# Patient Record
Sex: FEMALE | Race: OTHER
[De-identification: names, ages, dates, MRNs, and addresses within clinical notes are randomized per-mention and may not be internally consistent; named-entity substitution may affect disease eponyms.]

---

## 2018-07-31 ENCOUNTER — HOSPITAL ENCOUNTER (EMERGENCY)
Dept: HOSPITAL 54 - ER | Age: 23
Discharge: HOME | End: 2018-07-31
Payer: SELF-PAY

## 2018-07-31 VITALS — HEIGHT: 62 IN | BODY MASS INDEX: 23.92 KG/M2 | WEIGHT: 130 LBS

## 2018-07-31 VITALS — SYSTOLIC BLOOD PRESSURE: 128 MMHG | DIASTOLIC BLOOD PRESSURE: 67 MMHG

## 2018-07-31 DIAGNOSIS — F15.10: Primary | ICD-10-CM
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
## 2018-07-31 DIAGNOSIS — F10.10: ICD-10-CM

## 2018-07-31 DIAGNOSIS — F17.200: ICD-10-CM

## 2018-07-31 DIAGNOSIS — F41.9: ICD-10-CM

## 2018-07-31 LAB
ALBUMIN SERPL BCP-MCNC: 4.7 G/DL (ref 3.4–5)
ALP SERPL-CCNC: 83 U/L (ref 46–116)
ALT SERPL W P-5'-P-CCNC: 49 U/L (ref 12–78)
APAP SERPL-MCNC: < 2 UG/ML (ref 10–30)
AST SERPL W P-5'-P-CCNC: 30 U/L (ref 15–37)
BASOPHILS # BLD AUTO: 0.1 /CMM (ref 0–0.2)
BASOPHILS NFR BLD AUTO: 1.4 % (ref 0–2)
BILIRUB DIRECT SERPL-MCNC: 0.1 MG/DL (ref 0–0.2)
BILIRUB SERPL-MCNC: 0.7 MG/DL (ref 0.2–1)
BUN SERPL-MCNC: 20 MG/DL (ref 7–18)
CALCIUM SERPL-MCNC: 9.2 MG/DL (ref 8.5–10.1)
CHLORIDE SERPL-SCNC: 104 MMOL/L (ref 98–107)
CO2 SERPL-SCNC: 27 MMOL/L (ref 21–32)
CREAT SERPL-MCNC: 0.9 MG/DL (ref 0.6–1.3)
EOSINOPHIL NFR BLD AUTO: 1.9 % (ref 0–6)
ETHANOL SERPL-MCNC: < 3 MG/DL (ref 0–0)
GLUCOSE SERPL-MCNC: 104 MG/DL (ref 74–106)
HCT VFR BLD AUTO: 40 % (ref 33–45)
HGB BLD-MCNC: 13.2 G/DL (ref 11.5–14.8)
LYMPHOCYTES NFR BLD AUTO: 2.2 /CMM (ref 0.8–4.8)
LYMPHOCYTES NFR BLD AUTO: 31.8 % (ref 20–44)
MCH RBC QN AUTO: 30 PG (ref 26–33)
MCHC RBC AUTO-ENTMCNC: 33 G/DL (ref 31–36)
MCV RBC AUTO: 91 FL (ref 82–100)
MONOCYTES NFR BLD AUTO: 0.5 /CMM (ref 0.1–1.3)
MONOCYTES NFR BLD AUTO: 7.1 % (ref 2–12)
NEUTROPHILS # BLD AUTO: 4.1 /CMM (ref 1.8–8.9)
NEUTROPHILS NFR BLD AUTO: 57.8 % (ref 43–81)
PLATELET # BLD AUTO: 364 /CMM (ref 150–450)
POTASSIUM SERPL-SCNC: 3.9 MMOL/L (ref 3.5–5.1)
PROT SERPL-MCNC: 8.5 G/DL (ref 6.4–8.2)
RBC # BLD AUTO: 4.37 MIL/UL (ref 4–5.2)
RDW COEFFICIENT OF VARIATION: 11.8 (ref 11.5–15)
SALICYLATES SERPL-MCNC: < 2.8 MG/DL (ref 2.8–20)
SODIUM SERPL-SCNC: 139 MMOL/L (ref 136–145)
WBC NRBC COR # BLD AUTO: 7 K/UL (ref 4.3–11)

## 2018-07-31 PROCEDURE — 80329 ANALGESICS NON-OPIOID 1 OR 2: CPT

## 2018-07-31 PROCEDURE — 99406 BEHAV CHNG SMOKING 3-10 MIN: CPT

## 2018-07-31 PROCEDURE — A4606 OXYGEN PROBE USED W OXIMETER: HCPCS

## 2018-07-31 PROCEDURE — 80076 HEPATIC FUNCTION PANEL: CPT

## 2018-07-31 PROCEDURE — 96374 THER/PROPH/DIAG INJ IV PUSH: CPT

## 2018-07-31 PROCEDURE — G0480 DRUG TEST DEF 1-7 CLASSES: HCPCS

## 2018-07-31 PROCEDURE — 80048 BASIC METABOLIC PNL TOTAL CA: CPT

## 2018-07-31 PROCEDURE — Z7610: HCPCS

## 2018-07-31 PROCEDURE — 85025 COMPLETE CBC W/AUTO DIFF WBC: CPT

## 2018-07-31 PROCEDURE — 36415 COLL VENOUS BLD VENIPUNCTURE: CPT

## 2018-07-31 PROCEDURE — 99284 EMERGENCY DEPT VISIT MOD MDM: CPT

## 2018-07-31 NOTE — NUR
IV removed. Catheter intact and site benign. Pressure and 4x4 applied to site. 
No bleeding noted.Patient discharged to home in stable condition. Written and 
verbal after care instructions given. Patient verbalizes understanding of 
instruction. PT REC'D JUICE ON HER WAY OUT. PT AMUBLATED OUT WITH A STEADY 
GAIT. VSS. NAD NOTED.

## 2018-07-31 NOTE — NUR
[id number], PICKED UP FROM A DENTAL OFFICE ACTING [facility]. NOTED PARANOID, 
DEPRESSED. VSS. SAFETY AND COMFORT MEASURES PROVIDED. WILL MONITOR.

## 2019-01-30 NOTE — NUR
ED Nurse Note:



Patient ingested unknown amount of meth, unknown route and time. pt was brought 
in by [facility]. per gisel, felix on the scene. pt is uncoopertive. wont allow staff 
to take vital signs. pt is talking to herself. per gisel pt denies si. will 
continue to monitor.

## 2019-01-30 NOTE — EMERGENCY ROOM REPORT
History of Present Illness


General


Chief Complaint:  Overdose


Source:  Patient, EMS





Present Illness


HPI


Is a 23-year-old female with no past medical history.  She called 911 because 

she said she was not feeling well and someone is out to get her.  She smoked 

methamphetamine today.  Denies any fever chills.  Denies suicidal thoughts 

homicidal thought.  No nausea no vomiting.  No fever or chills.


Allergies:  


Coded Allergies:  


     No Known Allergies (Unverified , 1/30/19)





Patient History


Past Medical History:  see triage record, old chart reviewed


Past Surgical History:  none


Pertinent Family History:  none


Social History:  Reports: drug use - Methamphetamine


Last Menstrual Period:  unk


Pregnant Now:  No


Immunizations:  other


Reviewed Nursing Documentation:  PMH: Agreed; PSxH: Agreed





Nursing Documentation-PMH


Past Medical History Deferred:  Pt Cognitively Impaired


Past Medical History:  Deferred





Review of Systems


Eye:  Denies: eye pain, blurred vision


ENT:  Denies: ear pain, nose congestion, throat swelling


Respiratory:  Denies: cough, shortness of breath


Cardiovascular:  Denies: chest pain, palpitations


Gastrointestinal:  Denies: abdominal pain, diarrhea, nausea, vomiting


Musculoskeletal:  Denies: back pain, joint pain


Skin:  Denies: rash


Neurological:  Denies: headache, numbness


Endocrine:  Denies: increased thirst, increased urine


Hematologic/Lymphatic:  Denies: easy bruising


All Other Systems:  negative except mentioned in HPI





Physical Exam





Vital Signs








  Date Time  Temp Pulse Resp B/P (MAP) Pulse Ox O2 Delivery O2 Flow Rate FiO2


 


1/30/19 22:28  110 16 130/96 100 Room Air  





vitals with tachycardia


Sp02 EP Interpretation:  reviewed, normal


General Appearance:  well appearing, no apparent distress, alert


Head:  normocephalic, atraumatic


Eyes:  bilateral eye PERRL, bilateral eye EOMI


ENT:  hearing grossly normal, normal pharynx


Neck:  full range of motion, supple, no meningismus


Respiratory:  chest non-tender, lungs clear, normal breath sounds


Cardiovascular #1:  regular rate, rhythm, no murmur


Gastrointestinal:  normal bowel sounds, non tender, no mass, no organomegaly, 

no bruit, non-distended


Musculoskeletal:  back normal, gait/station normal, normal range of motion


Psychiatric:  mood/affect normal


Skin:  warm/dry





Medical Decision Making


Homeless Attestation


I, The treating physician, Dr Hubert Garay, has assessed and agrees that patient 

is medically stable for discharge to an outpatient disposition.


Diagnostic Impression:  


 Primary Impression:  


 Psychosis


 Qualified Codes:  F23 - Brief psychotic disorder


 Additional Impression:  


 Methamphetamine abuse


ER Course


Patient presents with drug-induced psychosis.  No suicidal thoughts or 

homicidal thought.  No auditory hallucination.  We'll observe patient until 

better.  No criteria for 5150.





This patient is a chronic risk of self injury due to poor impulse control, 

limited coping skills, and judgment intermittently impaired by intoxication.  I 

believe that the available clinical evidence to suggest that these 

characteristics derived primarily from personality disorder and are likely very 

stable over time.  Hospitalization would likely attenuate risk of self-harm 

only during MCC period, without lasting risk reduction.  Serious self-harm

, while possible, would likely be inadvertent, and because of impulsivity, and 

foreseeable.  For these reasons, I do not believe hospitalization would provide 

meaningful reduction in risk of self-harm.





Last Vital Signs








  Date Time  Temp Pulse Resp B/P (MAP) Pulse Ox O2 Delivery O2 Flow Rate FiO2


 


1/30/19 22:37  68 16 130/96 100 Room Air  








Status:  improved


Disposition:  HOME, SELF-CARE


Condition:  Stable





Additional Instructions:  


Stop using drugs.  Follow-up with rehabilitation in 7 days.  Return if worse.











Hubert Garay MD Jan 30, 2019 22:56

## 2019-01-31 NOTE — NUR
ED Nurse Note:



Patient is being discharged cleared by ERMD. discharge paper/instruction given 
to the patient, patient verbalized understanding. patient a/o x4, ambulated out 
of Ed with steady gait, with all belongings. ID band removed. pt asked sandwich 
and food was given.

## 2019-03-02 NOTE — NUR
FOOD TRAY AND BUS CARD PROVIDED.
[facility] LCSW AT BEDSIDE FOR EVAL.
PROVIDED W LUNCH TRAY, PT TOLERATING PO WELL
PT BIBRA 87 FOR OVERDOSE, TOOK UNKNOWN AMOUNT OF ABILIFY, PT ALSO TOOK METH 
THIS AM. PT IS AAOX4, NOTED DROWSINESS, V/S STABLE, KEPT RESTED AND 
COMFORTABLE, WILL CONTINUE TO MONITOR.
PT LABS DRAWNED, URINE COLLECTED AND SENT TO LAB.
Patient given written and verbal discharge instructions. Patient verbalizes 
understanding of instructions. Patient is ambulatory with steady gait. Refuses 
offer of shelter placement. Patient given list of available shelters in 
surrounding area.
- - -

## 2019-04-01 ENCOUNTER — HOSPITAL ENCOUNTER (EMERGENCY)
Dept: HOSPITAL 72 - EMR | Age: 24
Discharge: HOME | End: 2019-04-01
Payer: COMMERCIAL

## 2019-04-01 VITALS — DIASTOLIC BLOOD PRESSURE: 73 MMHG | SYSTOLIC BLOOD PRESSURE: 106 MMHG

## 2019-04-01 VITALS — DIASTOLIC BLOOD PRESSURE: 77 MMHG | SYSTOLIC BLOOD PRESSURE: 102 MMHG

## 2019-04-01 VITALS — WEIGHT: 125 LBS | HEIGHT: 62 IN | BODY MASS INDEX: 23 KG/M2

## 2019-04-01 VITALS — SYSTOLIC BLOOD PRESSURE: 110 MMHG | DIASTOLIC BLOOD PRESSURE: 75 MMHG

## 2019-04-01 VITALS — DIASTOLIC BLOOD PRESSURE: 59 MMHG | SYSTOLIC BLOOD PRESSURE: 104 MMHG

## 2019-04-01 VITALS — SYSTOLIC BLOOD PRESSURE: 111 MMHG | DIASTOLIC BLOOD PRESSURE: 63 MMHG

## 2019-04-01 VITALS — SYSTOLIC BLOOD PRESSURE: 111 MMHG | DIASTOLIC BLOOD PRESSURE: 84 MMHG

## 2019-04-01 VITALS — DIASTOLIC BLOOD PRESSURE: 62 MMHG | SYSTOLIC BLOOD PRESSURE: 120 MMHG

## 2019-04-01 VITALS — DIASTOLIC BLOOD PRESSURE: 61 MMHG | SYSTOLIC BLOOD PRESSURE: 121 MMHG

## 2019-04-01 VITALS — DIASTOLIC BLOOD PRESSURE: 67 MMHG | SYSTOLIC BLOOD PRESSURE: 108 MMHG

## 2019-04-01 VITALS — SYSTOLIC BLOOD PRESSURE: 100 MMHG | DIASTOLIC BLOOD PRESSURE: 77 MMHG

## 2019-04-01 DIAGNOSIS — F31.9: Primary | ICD-10-CM

## 2019-04-01 DIAGNOSIS — F19.10: ICD-10-CM

## 2019-04-01 LAB
ADD MANUAL DIFF: NO
ALBUMIN SERPL-MCNC: 4.2 G/DL (ref 3.4–5)
ALBUMIN/GLOB SERPL: 1.2 {RATIO} (ref 1–2.7)
ALP SERPL-CCNC: 101 U/L (ref 46–116)
ALT SERPL-CCNC: 154 U/L (ref 12–78)
ANION GAP SERPL CALC-SCNC: 12 MMOL/L (ref 5–15)
APPEARANCE UR: CLEAR
APTT PPP: (no result) S
AST SERPL-CCNC: 65 U/L (ref 15–37)
BASOPHILS NFR BLD AUTO: 1.3 % (ref 0–2)
BILIRUB SERPL-MCNC: 0.3 MG/DL (ref 0.2–1)
BUN SERPL-MCNC: 18 MG/DL (ref 7–18)
CALCIUM SERPL-MCNC: 9 MG/DL (ref 8.5–10.1)
CHLORIDE SERPL-SCNC: 104 MMOL/L (ref 98–107)
CO2 SERPL-SCNC: 25 MMOL/L (ref 21–32)
CREAT SERPL-MCNC: 0.7 MG/DL (ref 0.55–1.3)
EOSINOPHIL NFR BLD AUTO: 1.2 % (ref 0–3)
ERYTHROCYTE [DISTWIDTH] IN BLOOD BY AUTOMATED COUNT: 12.2 % (ref 11.6–14.8)
GLOBULIN SER-MCNC: 3.6 G/DL
GLUCOSE UR STRIP-MCNC: NEGATIVE MG/DL
HCT VFR BLD CALC: 36.5 % (ref 37–47)
HGB BLD-MCNC: 12.3 G/DL (ref 12–16)
KETONES UR QL STRIP: NEGATIVE
LEUKOCYTE ESTERASE UR QL STRIP: (no result)
LYMPHOCYTES NFR BLD AUTO: 26.3 % (ref 20–45)
MCV RBC AUTO: 90 FL (ref 80–99)
MONOCYTES NFR BLD AUTO: 7.7 % (ref 1–10)
NEUTROPHILS NFR BLD AUTO: 63.5 % (ref 45–75)
NITRITE UR QL STRIP: NEGATIVE
PH UR STRIP: 5 [PH] (ref 4.5–8)
PLATELET # BLD: 303 K/UL (ref 150–450)
POTASSIUM SERPL-SCNC: 4 MMOL/L (ref 3.5–5.1)
PROT UR QL STRIP: (no result)
RBC # BLD AUTO: 4.06 M/UL (ref 4.2–5.4)
SODIUM SERPL-SCNC: 141 MMOL/L (ref 136–145)
SP GR UR STRIP: 1.02 (ref 1–1.03)
UROBILINOGEN UR-MCNC: NORMAL MG/DL (ref 0–1)
WBC # BLD AUTO: 8.1 K/UL (ref 4.8–10.8)

## 2019-04-01 PROCEDURE — 81025 URINE PREGNANCY TEST: CPT

## 2019-04-01 PROCEDURE — 80329 ANALGESICS NON-OPIOID 1 OR 2: CPT

## 2019-04-01 PROCEDURE — 81003 URINALYSIS AUTO W/O SCOPE: CPT

## 2019-04-01 PROCEDURE — 80053 COMPREHEN METABOLIC PANEL: CPT

## 2019-04-01 PROCEDURE — 96372 THER/PROPH/DIAG INJ SC/IM: CPT

## 2019-04-01 PROCEDURE — 84484 ASSAY OF TROPONIN QUANT: CPT

## 2019-04-01 PROCEDURE — 99284 EMERGENCY DEPT VISIT MOD MDM: CPT

## 2019-04-01 PROCEDURE — 80307 DRUG TEST PRSMV CHEM ANLYZR: CPT

## 2019-04-01 PROCEDURE — 36415 COLL VENOUS BLD VENIPUNCTURE: CPT

## 2019-04-01 PROCEDURE — 84443 ASSAY THYROID STIM HORMONE: CPT

## 2019-04-01 PROCEDURE — 85025 COMPLETE CBC W/AUTO DIFF WBC: CPT

## 2019-04-01 NOTE — NUR
ED Nurse Note:



VERBAL ORDERL OF LABS WITH ATIVAN 2MG IM RECEIVED.ATIVAN 2MG IM GIVEN ON THE 
LEFT DELTOID, WITNESSED BY DIGNA JIMENEZ. NO S/S OF TRAUMA.

## 2019-04-01 NOTE — EMERGENCY ROOM REPORT
History of Present Illness


General


Chief Complaint:  Behavioral Complaint


Source:  Patient, Medical Record


 (Denis Cabrales MD)





Present Illness


HPI


Patient is a 23-year-old female brought in by boyfriend after increased 

agitation.  Patient reportedly had been recently discharged from a psychiatric 

facility.  She had prior history of bipolar disorder.  Medications are unknown.

  Patient reportedly had been more agitated after leaving from the area where 

her boyfriend was for approximately 1/2-hour and subsequently becoming more   

agitated.  Patient was noted to have prior history of bipolar but would not 

state what medication she takes.  She was noted to be behaving bizarrely.  She 

apparently had been walking into traffic.


 (Denis Cabrales MD)


Allergies:  


Coded Allergies:  


     No Known Allergies (Unverified , 1/30/19)





Patient History


Past Medical History:  see triage record


Reviewed Nursing Documentation:  PMH: Agreed; PSxH: Agreed (Denis Cabrales MD)





Nursing Documentation-PMH


Past Medical History:  No Stated History


 (Denis Cabrales MD)





Review of Systems


All Other Systems:  negative except mentioned in HPI


 (Denis Cabrales MD)





Physical Exam





Vital Signs








  Date Time  Temp Pulse Resp B/P (MAP) Pulse Ox O2 Delivery O2 Flow Rate FiO2


 


4/1/19 13:00 98.4 108 22 111/84 98 Room Air  








Sp02 EP Interpretation:  reviewed, normal


General Appearance:  alert/responsive, no apparent distress, GCS 15, non-toxic


Head:  atraumatic


Eyes:  normal eye exam, lids + conjunctiva normal, other - pupils dilated 5mm


ENT:  hearing intact, no angioedema


Neck:  supple/symm/no masses, no meningismus


Respiratory:  effort normal, no wheezing, chest symmetrical


Cardiovascular:  regular rate, rhythm, no edema


Cardiovascular #2:  2+ carotid (R), 2+ carotid (L), 2+ dorsalis pedis (R), 2+ 

dorsalis pedis (L)


Gastrointestinal:  non-tender, no mass, non-distended, no rebound/guarding, 

normal bowel sounds


Musculoskeletal:  gait & station normal, strength & tone normal, normal ROM, non

-tender


Neurologic:  normal inspection, CN II-XII intact, oriented x3, sensory intact, 

normal speech


Psychiatric:  other - agitated ambulatory, intermittently yelliing


Skin:  no rash, well hydrated


Lymphatic:  normal inspection


 (Denis Cabrales MD)





Medical Decision Making


Diagnostic Impression:  


 Primary Impression:  


 Bipolar disorder


 Additional Impression:  


 Substance abuse


ER Course


Patient presented for agitation.  Differential diagnoses include substance abuse

, psychosis, bipolar disorder, depression, malingering.  because of complexity 

of patient's case laboratory testing and imaging studies were ordered.. Patient 

was noted to be markedly agitated while in the emergency department.  She 

pulled the fire alarm as well as was yelling and screaming.  She was noted to 

have dilated pupils and appears to be somewhat intoxicated with some 

stimulants.  Patient was given IM Ativan as well as Haldol and Benadryl.  She 

was placed in restraints due to agitation. Patient was endorsed to Dr. Mariee 

pending reassessment after medications.


 (Denis Cabrales MD)


ER Course


This patient originally presented to Dr. Cabrales.  I took over care of this 

patient.  She had used multiple illicit drugs and had been acting dangerously 

and bizarrely and was very agitated and aggressive per report.  She had been 

medicated heavily prior to my care for her.  The patient slept during her ED 

course.





This patient presented with drug intoxication.  The patient's laboratory workup 

was noncontributory other than urine drug screen being positive for multiple 

illicit substances and transaminitis.  There is no evidence of trauma or injury 

on physical examination of this patient.  The patient was allowed to sober up 

in the emergency department and was able to ambulate and articulate desire to 

go home.  The patient was clinically sober at the time of discharge.  No acute 

emergency medical condition is identified.  The patient was educated on the 

dangers of alcohol intoxication and abuse.  The patient was given a list of the 

local rehabilitation clinics.





Laboratory Tests








Test


  4/1/19


14:00


 


White Blood Count


  8.1 K/UL


(4.8-10.8)


 


Red Blood Count


  4.06 M/UL


(4.20-5.40)  L


 


Hemoglobin


  12.3 G/DL


(12.0-16.0)


 


Hematocrit


  36.5 %


(37.0-47.0)  L


 


Mean Corpuscular Volume 90 FL (80-99)  


 


Mean Corpuscular Hemoglobin


  30.3 PG


(27.0-31.0)


 


Mean Corpuscular Hemoglobin


Concent 33.7 G/DL


(32.0-36.0)


 


Red Cell Distribution Width


  12.2 %


(11.6-14.8)


 


Platelet Count


  303 K/UL


(150-450)


 


Mean Platelet Volume


  5.7 FL


(6.5-10.1)  L


 


Neutrophils (%) (Auto)


  63.5 %


(45.0-75.0)


 


Lymphocytes (%) (Auto)


  26.3 %


(20.0-45.0)


 


Monocytes (%) (Auto)


  7.7 %


(1.0-10.0)


 


Eosinophils (%) (Auto)


  1.2 %


(0.0-3.0)


 


Basophils (%) (Auto)


  1.3 %


(0.0-2.0)


 


Urine Color Pale yellow  


 


Urine Appearance Clear  


 


Urine pH 5 (4.5-8.0)  


 


Urine Specific Gravity


  1.020


(1.005-1.035)


 


Urine Protein


  1+ (NEGATIVE)


H


 


Urine Glucose (UA)


  Negative


(NEGATIVE)


 


Urine Ketones


  Negative


(NEGATIVE)


 


Urine Blood


  1+ (NEGATIVE)


H


 


Urine Nitrite


  Negative


(NEGATIVE)


 


Urine Bilirubin


  Negative


(NEGATIVE)


 


Urine Urobilinogen


  Normal MG/DL


(0.0-1.0)


 


Urine Leukocyte Esterase


  2+ (NEGATIVE)


H


 


Urine RBC


  0-2 /HPF (0 -


2)


 


Urine WBC


  2-4 /HPF (0 -


2)


 


Urine Squamous Epithelial


Cells Occasional


/LPF


 


Urine Bacteria


  Occasional


/HPF (NONE)


 


Urine HCG, Qualitative


  Negative


(NEGATIVE)


 


Sodium Level


  141 MMOL/L


(136-145)


 


Potassium Level


  4.0 MMOL/L


(3.5-5.1)


 


Chloride Level


  104 MMOL/L


()


 


Carbon Dioxide Level


  25 MMOL/L


(21-32)


 


Anion Gap


  12 mmol/L


(5-15)


 


Blood Urea Nitrogen


  18 mg/dL


(7-18)


 


Creatinine


  0.7 MG/DL


(0.55-1.30)


 


Estimate Glomerular


Filtration Rate > 60 mL/min


(>60)


 


Glucose Level


  86 MG/DL


()


 


Calcium Level


  9.0 MG/DL


(8.5-10.1)


 


Total Bilirubin


  0.3 MG/DL


(0.2-1.0)


 


Aspartate Amino Transferase


(AST) 65 U/L (15-37)


H


 


Alanine Aminotransferase (ALT)


  154 U/L


(12-78)  H


 


Alkaline Phosphatase


  101 U/L


()


 


Troponin I


  0.000 ng/mL


(0.000-0.056)


 


Total Protein


  7.8 G/DL


(6.4-8.2)


 


Albumin


  4.2 G/DL


(3.4-5.0)


 


Globulin 3.6 g/dL  


 


Albumin/Globulin Ratio 1.2 (1.0-2.7)  


 


Thyroid Stimulating Hormone


(TSH) 2.492 uiU/mL


(0.358-3.740)


 


Salicylates Level


  2.5 ug/mL


(2.8-20)  L


 


Urine Opiates Screen


  Negative


(NEGATIVE)


 


Acetaminophen Level


  < 2 MCG/ML


(10-30)  L


 


Urine Barbiturates Screen


  Negative


(NEGATIVE)


 


Phencyclidine (PCP) Screen


  Negative


(NEGATIVE)


 


Urine Amphetamines Screen


  Positive


(NEGATIVE)  H


 


Urine Benzodiazepines Screen


  Negative


(NEGATIVE)


 


Urine Cocaine Screen


  Positive


(NEGATIVE)  H


 


Urine Marijuana (THC) Screen


  Positive


(NEGATIVE)  H


 


Serum Alcohol < 3 mg/dL  








 (Lolly Goodson DO)





Last Vital Signs








  Date Time  Temp Pulse Resp B/P (MAP) Pulse Ox O2 Delivery O2 Flow Rate FiO2


 


4/1/19 13:08  108 22   Room Air  


 


4/1/19 13:06 98.4   111/84 95   








 (Denis Cabrales MD)


Status:  improved


 (Lolly Goodson DO)


Disposition:  HOME, SELF-CARE


Condition:  Improved











Denis Cabrales MD Apr 1, 2019 13:52


Lolly Goodson DO Apr 1, 2019 21:59

## 2019-04-01 NOTE — NUR
ED Nurse Note:



received order to give Haldol 5mg IM and Benadryl 50mg IM. Administered Haldol 
5mg IM right Vastus lateralis muscleand Benadryl 50mg IM left Vastus lateralis 
muscle, witnessed by DIGNA Hernandez.

## 2019-04-01 NOTE — NUR
ED Nurse Note:



PT IS COMBATIVE AND SCREAMING. RECEIVED VERBAL ORDER TO PLACE ON 4 POINT 
LEATHER RESTRAINTS. RESTRAINTS APPLIED. NO TRAUMA. CIRCULATION CHECKED.

## 2019-04-01 NOTE — NUR
ED Nurse Note:

 Assited pt walked to Bathroom. Pt is A/O X4, able to ambulated with steady 
gait. 

Informed Dr. Goodson that pt is able to walk steadly. Assited pt walked back 
to her bed. Waiting for discharge paper to be ready.

## 2019-04-01 NOTE — NUR
HAND-OFF: 

Report given to DIGNA Buenrostro. Pt now moved to room Ortho. No s/s of distress. Pt is 
a/ox4 at this time, calm. Denies SI  and HI.

## 2019-04-01 NOTE — NUR
ED Nurse Note:

pt sleeping with even and regular resp.  pt with restraints removed and remains 
under close observation.

## 2019-04-01 NOTE — NUR
ED Nurse Note:



AMBULATED IN TO ER. PER BF, HE CALLED 911 FOR HELP BECAUSE SHE WAS RUNNING 
AROUND THE STREET. PER TRIAGE NURSE, PT WAS DROPPED OFF BY THE POLICE OFFICERS 
WITHOUT ENDORSEMENTS. PT ADMITS THAT SHE TOOK COCAINE, CRAK AND METH ABOUT AN 
HOUR AGO. HEALING SCARS OF CUT NOTED ON THE LEFT ARMS. NO RECENT TRAUMA NOR 
CUTS. A/OX3, PT IS EXCESSIVELY HAPPY BUT COMBATIVE TO THE STAFFS.

## 2019-04-01 NOTE — NUR
ER DISCHARGE NOTE:

Patient is cleared to be discharged per ERMD, pt is aox4, on room air, with 
stable vital signs. pt was given dc and prescription instructions, pt was able 
to verbalize understanding, pt id band and iv site removed without 
complications. pt is able to ambulate with steady gait. At time of discharge, 
patient's clothing was already on patient. the clothes were appropriate for 
patient. belongings were taken from locker #2.

## 2019-04-01 NOTE — NUR
ED Nurse Note:

Received report from Stefan/RN in Room Ortho.  Pt is sleeping quietly at this 
time, Vital signs stable, no c/o pain or discomfort at this time. Will continue 
to monitor.

## 2019-10-13 ENCOUNTER — HOSPITAL ENCOUNTER (EMERGENCY)
Dept: HOSPITAL 72 - EMR | Age: 24
Discharge: LEFT BEFORE DISCHARGE | End: 2019-10-13
Payer: COMMERCIAL

## 2019-10-13 VITALS — SYSTOLIC BLOOD PRESSURE: 130 MMHG | DIASTOLIC BLOOD PRESSURE: 66 MMHG

## 2019-10-13 VITALS — DIASTOLIC BLOOD PRESSURE: 66 MMHG | SYSTOLIC BLOOD PRESSURE: 130 MMHG

## 2019-10-13 VITALS — WEIGHT: 135 LBS | HEIGHT: 62 IN | BODY MASS INDEX: 24.84 KG/M2

## 2019-10-13 DIAGNOSIS — F16.10: Primary | ICD-10-CM

## 2019-10-13 PROCEDURE — 99282 EMERGENCY DEPT VISIT SF MDM: CPT

## 2019-10-13 NOTE — NUR
ED Nurse Note:



Pt spoke with MD, immediately after pt states she wants to leave and walked out 
of hospital, MD offered care, rest or what pt wants or neds, pt refused stating 
she will just go home and eloped, pt left ambulating with nad noted.

## 2019-10-13 NOTE — EMERGENCY ROOM REPORT
History of Present Illness


General


Chief Complaint:  Behavioral Complaint


Source:  Patient





Present Illness


HPI


24-year-old female presents ED for evaluation.  States that she has been 

hearing voices since last night.  Telling her to "stop" and "go".  Denies SI or 

HI.  Admits to ecstasy use and meth use.  Patient states she does have a 

psychiatric history but is not taking any medications at this time.  denies 

alcohol use.  Denies chest pain or shortness of breath.  No other aggravating 

relieving factors.  denies any other associated symptoms


Allergies:  


Coded Allergies:  


     No Known Allergies (Unverified , 1/30/19)





Patient History


Past Medical History:  psych hx


Past Surgical History:  none


Pertinent Family History:  none


Social History:  Reports: drug use; Denies: smoking, alcohol use


Last Menstrual Period:  10/7/19


Pregnant Now:  No


Immunizations:  UTD


Reviewed Nursing Documentation:  PMH: Agreed; PSxH: Agreed





Nursing Documentation-PMH


Past Medical History:  No History, Except For





Review of Systems


All Other Systems:  negative except mentioned in HPI





Physical Exam





Vital Signs








  Date Time  Temp Pulse Resp B/P (MAP) Pulse Ox O2 Delivery O2 Flow Rate FiO2


 


10/13/19 04:23 98.4 110 14 130/66 (87) 99 Room Air  








Sp02 EP Interpretation:  reviewed, normal


General Appearance:  no apparent distress, alert, GCS 15, non-toxic, other - 

anxious


Head:  normocephalic, atraumatic


Eyes:  bilateral eye normal inspection, bilateral eye PERRL


ENT:  hearing grossly normal, normal pharynx, no angioedema, normal voice


Neck:  full range of motion, supple/symm/no masses


Respiratory:  chest non-tender, lungs clear, normal breath sounds, speaking 

full sentences


Cardiovascular #1:  regular rate, rhythm, no edema


Cardiovascular #2:  2+ carotid (R), 2+ carotid (L), 2+ radial (R), 2+ radial (L)

, 2+ dorsalis pedis (R), 2+ dorsalis pedis (L)


Gastrointestinal:  normal bowel sounds, non tender, soft, non-distended, no 

guarding, no rebound


Rectal:  deferred


Genitourinary:  normal inspection, no CVA tenderness


Musculoskeletal:  back normal, gait/station normal, normal range of motion, non-

tender


Neurologic:  alert, oriented x3, responsive, motor strength/tone normal, 

sensory intact, speech normal


Psychiatric:  no suicidal/homicidal ideation, anxious


Reflexes:  3+ bicep (R), 3+ bicep (L), 3+ tricep (R), 3+ tricep (L), 3+ knee (R)

, 3+ knee (L)


Skin:  no rash


Lymphatic:  no adenopathy





Medical Decision Making


Diagnostic Impression:  


 Primary Impression:  


 Substance abuse


ER Course


Hospital Course 


25 yo F presents with hearing voices, s/p ecstacy use





Differential diagnoses include: Psychosis, EtOH, drug abuse





Clinical course


patient placed on stretcher.  Her initial history physical exam reveals female 

in no acute distress.  Patient is cooperative with exam.  Answering questions 

appropriately.  Does appear anxious.  No evidence of hallucinations during exam.





I offered to provide anxiolytic and allow patient to rest here.  Vitals stable.

  Patient declined and states she would rather go home





Patient got up and walked out of ED before any further discussion could be had





i.  I feel this is a highly complex case requiring extensive working including 

EKG/Rhythm strip, Xray/CT/US, Blood/urine lab work, repeat exams while in ED, 

and administration of strong opiates/narcotics for pain control, admission to 

hospital or close patient follow up.  





Diagnosis -substance abuse 





patient eloped from ED





Last Vital Signs








  Date Time  Temp Pulse Resp B/P (MAP) Pulse Ox O2 Delivery O2 Flow Rate FiO2


 


10/13/19 04:40 98.4  14 130/66 99 Room Air  


 


10/13/19 04:30  110      








Status:  unchanged


Disposition:  ELOPED


Condition:  Stable


Referrals:  


NON PHYSICIAN (PCP)











Star Ragland MD Oct 13, 2019 06:05 independent

## 2019-10-13 NOTE — NUR
ED Nurse Note:



Recieved pt on sil, awake, alert and oriented x 4, pt here with c/o hearing 
voices after taking ecstacy 1 hour ago, pt denies homicidal or suicidal 
ideations, denies visual hallucinations but states hearing voices that are 
telling her to dance, pt is clean in appearance, denies pqin, no sob or labored 
breathing ntoed,urine sample collected, will resume care as ordered and closely 
monitor.

## 2019-10-19 ENCOUNTER — HOSPITAL ENCOUNTER (EMERGENCY)
Dept: HOSPITAL 72 - EMR | Age: 24
Discharge: HOME | End: 2019-10-19
Payer: COMMERCIAL

## 2019-10-19 VITALS — HEIGHT: 63 IN | WEIGHT: 135 LBS | BODY MASS INDEX: 23.92 KG/M2

## 2019-10-19 VITALS — SYSTOLIC BLOOD PRESSURE: 134 MMHG | DIASTOLIC BLOOD PRESSURE: 78 MMHG

## 2019-10-19 VITALS — SYSTOLIC BLOOD PRESSURE: 130 MMHG | DIASTOLIC BLOOD PRESSURE: 82 MMHG

## 2019-10-19 DIAGNOSIS — F17.200: ICD-10-CM

## 2019-10-19 DIAGNOSIS — F15.159: Primary | ICD-10-CM

## 2019-10-19 DIAGNOSIS — N39.0: ICD-10-CM

## 2019-10-19 DIAGNOSIS — F31.9: ICD-10-CM

## 2019-10-19 DIAGNOSIS — F16.10: ICD-10-CM

## 2019-10-19 LAB
ADD MANUAL DIFF: NO
ALBUMIN SERPL-MCNC: 4.8 G/DL (ref 3.4–5)
ALBUMIN/GLOB SERPL: 1.2 {RATIO} (ref 1–2.7)
ALP SERPL-CCNC: 87 U/L (ref 46–116)
ALT SERPL-CCNC: 23 U/L (ref 12–78)
ANION GAP SERPL CALC-SCNC: 10 MMOL/L (ref 5–15)
APPEARANCE UR: (no result)
APTT PPP: YELLOW S
AST SERPL-CCNC: 22 U/L (ref 15–37)
BASOPHILS NFR BLD AUTO: 1.3 % (ref 0–2)
BILIRUB DIRECT SERPL-MCNC: 0.3 MG/DL (ref 0–0.3)
BILIRUB SERPL-MCNC: 1.4 MG/DL (ref 0.2–1)
BUN SERPL-MCNC: 11 MG/DL (ref 7–18)
CALCIUM SERPL-MCNC: 9.5 MG/DL (ref 8.5–10.1)
CHLORIDE SERPL-SCNC: 101 MMOL/L (ref 98–107)
CO2 SERPL-SCNC: 28 MMOL/L (ref 21–32)
CREAT SERPL-MCNC: 0.9 MG/DL (ref 0.55–1.3)
EOSINOPHIL NFR BLD AUTO: 1.6 % (ref 0–3)
ERYTHROCYTE [DISTWIDTH] IN BLOOD BY AUTOMATED COUNT: 10.8 % (ref 11.6–14.8)
GLOBULIN SER-MCNC: 3.9 G/DL
GLUCOSE UR STRIP-MCNC: NEGATIVE MG/DL
HCT VFR BLD CALC: 38.6 % (ref 37–47)
HGB BLD-MCNC: 13.6 G/DL (ref 12–16)
KETONES UR QL STRIP: (no result)
LEUKOCYTE ESTERASE UR QL STRIP: (no result)
LYMPHOCYTES NFR BLD AUTO: 34.6 % (ref 20–45)
MCV RBC AUTO: 88 FL (ref 80–99)
MONOCYTES NFR BLD AUTO: 8.2 % (ref 1–10)
NEUTROPHILS NFR BLD AUTO: 54.3 % (ref 45–75)
NITRITE UR QL STRIP: NEGATIVE
PH UR STRIP: 5 [PH] (ref 4.5–8)
PLATELET # BLD: 326 K/UL (ref 150–450)
POTASSIUM SERPL-SCNC: 3.2 MMOL/L (ref 3.5–5.1)
PROT UR QL STRIP: (no result)
RBC # BLD AUTO: 4.41 M/UL (ref 4.2–5.4)
SODIUM SERPL-SCNC: 139 MMOL/L (ref 136–145)
SP GR UR STRIP: 1.02 (ref 1–1.03)
UROBILINOGEN UR-MCNC: NORMAL MG/DL (ref 0–1)
WBC # BLD AUTO: 8.4 K/UL (ref 4.8–10.8)

## 2019-10-19 PROCEDURE — 81025 URINE PREGNANCY TEST: CPT

## 2019-10-19 PROCEDURE — 36415 COLL VENOUS BLD VENIPUNCTURE: CPT

## 2019-10-19 PROCEDURE — 80053 COMPREHEN METABOLIC PANEL: CPT

## 2019-10-19 PROCEDURE — 87086 URINE CULTURE/COLONY COUNT: CPT

## 2019-10-19 PROCEDURE — 85025 COMPLETE CBC W/AUTO DIFF WBC: CPT

## 2019-10-19 PROCEDURE — 82248 BILIRUBIN DIRECT: CPT

## 2019-10-19 PROCEDURE — 87181 SC STD AGAR DILUTION PER AGT: CPT

## 2019-10-19 PROCEDURE — 81001 URINALYSIS AUTO W/SCOPE: CPT

## 2019-10-19 PROCEDURE — 99284 EMERGENCY DEPT VISIT MOD MDM: CPT

## 2019-10-19 PROCEDURE — 80307 DRUG TEST PRSMV CHEM ANLYZR: CPT

## 2019-10-19 NOTE — NUR
ER Nurse Note:



Pt pulled out IV. Bleeding controled; site clean and bandaged. ER PA notified. 
Will continue to meka.

## 2019-10-19 NOTE — NUR
ER Nurse Note:



Pt brought in by ambulance 829 from home c/o behavioral complaint. Per 
significant other, pt is acting erratic, hearing voices and walking into 
traffic. Per significant other, pt took marijuana and estacy. Pt denies SI. 
House supervisor notifed for sitter. Belongings taken from pt. [employer] and 
significant other at bedside.

## 2019-10-19 NOTE — EMERGENCY ROOM REPORT
History of Present Illness


General


Chief Complaint:  Behavioral Complaint


Source:  Significant Other





Present Illness


HPI


24-year-old female with extensive history of methamphetamine and ecstasy use 

brought in by paramedics after trying to open the car door and run into the 

traffic.  Patient admits to using ecstasy and methamphetamines together, denies 

all other drug use, tobacco smoke and alcohol intake.  At this time patient 

denies any suicidal homicidal ideation.  Denies delusions and hallucinations.  

Reports that she opened the door to get some air.  Denies pain, chest pain, 

shortness of breath, palpitation or other associated symptoms.  Patient is 

sitting comfortably with stable vital signs.


Allergies:  


Coded Allergies:  


     No Known Allergies (Unverified , 1/30/19)





Patient History


Past Medical History:  see triage record


Past Surgical History:  unable to obtain


Family History:  none


Social History:  drug use - meth, ecstacy


Pregnant Now:  No


Immunizations:  UTD


Reviewed Nursing Documentation:  PMH: Agreed; PSxH: Agreed





Nursing Documentation-PMH


Past Medical History:  No History, Except For


History Of Psychiatric Problem:  Yes - BIPOLAR





Review of Systems


All Other Systems:  negative except mentioned in HPI





Physical Exam





Vital Signs








  Date Time  Temp Pulse Resp B/P (MAP) Pulse Ox O2 Delivery O2 Flow Rate FiO2


 


10/19/19 19:50 98.8 100 16 130/82 (98) 100 Room Air  








Sp02 EP Interpretation:  reviewed, normal


General Appearance:  alert/responsive, no apparent distress, GCS 15, non-toxic


Head:  atraumatic


Eyes:  PERRL, lids + conjunctiva normal


ENT:  hearing intact, no angioedema


Neck:  supple/symm/no masses, no meningismus


Respiratory:  effort normal, no wheezing, chest symmetrical


Cardiovascular:  regular rate, rhythm, no edema


Cardiovascular #2:  2+ radial (R), 2+ radial (L)


Gastrointestinal:  non-tender, no mass, non-distended, no rebound/guarding, 

normal bowel sounds


Musculoskeletal:  normal inspection, gait & station normal, digits & nails 

normal


Neurologic:  normal inspection, CN II-XII intact, oriented x3


Psychiatric:  memory normal, no suicidal/homicidal ideation, other - Appears to 

be under the influence of stimulant


Skin:  no rash


Lymphatic:  normal inspection, normal cervical nodes





Medical Decision Making


PA Attestation


All my diagnosis and treatment plans were reviewed ad discussed with my 

supervising physician Dr. Kothakota


Diagnostic Impression:  


 Primary Impression:  


 Amphetamine abuse


 Additional Impressions:  


 Drug-induced psychotic disorder


 Ecstasy abuse


 UTI (urinary tract infection)


ER Course


24-year-old female with extensive history of methamphetamine and ecstasy use 

brought in by paramedics  after trying to open the car door and run into the 

traffic.  Patient admits to using ecstasy and methamphetamines together, denies 

all other drug use, tobacco smoke and alcohol intake.  At this time patient 

denies any suicidal homicidal ideation.  Denies delusions and hallucinations.  

Reports that she opened the door to get some air.  Denies pain, chest pain, 

shortness of breath, palpitation or other associated symptoms.  Patient is 

sitting comfortably with stable vital signs.





Ddx considered but are not limited to: generalized anxiety disorder, panic 

attack, depression with psycotic featurs, bipolar disorder, drug overdose 














Vital signs: are WNL, pt. is afebrile








 H&PE are most consistent with: Methamphetamine abuse, psychosis secondary to 

stimulant use, UTI














ORDERS: Psychiatric order set








ED INTERVENTIONS: NS bolus


























DISCHARGE: At this time pt. is stable for d/c to home. Will provide printed 

patient care instructions, and any necessary prescriptions. Care plan and 

follow up instructions have been discussed with the patient prior to discharge.

,  Check perception of psychiatric facility





Last Vital Signs








  Date Time  Temp Pulse Resp B/P (MAP) Pulse Ox O2 Delivery O2 Flow Rate FiO2


 


10/19/19 20:10  100 16   Room Air  


 


10/19/19 20:10 98.8   130/82 100   








Disposition:  HOME, SELF-CARE


Condition:  Stable


Patient Instructions:  Psychosis, Stimulant Use Disorder-Methamphetamines, 

Urinary Tract Infection, Easy-to-Read











Vicky Brunson Oct 19, 2019 20:37

## 2019-10-19 NOTE — NUR
ER Nurse Note:



Pt not on hold by [employer]. Pt seen, treated, medically cleared for discharge by 
ERMD. Discharge instuctions and prescriptions given with repeat verbalization 
by pt. Emphasized to follow up with primay care provider. All orders completed 
per ERMD orders. Pt a&ox4, VSS, no signs of distress. ID band removed. All 
questions answered per pt's questions. Pt left with all belongings, left with 
own transportation.

## 2019-10-19 NOTE — NUR
ER Nurse Note:



Pt is placed on hold by [employer]. Per [employer], [employer] team will not visit becuase pt is 
under the influence. Pt is resting in room with significant other. All orders 
completed per ER PA orders. Blood and urine sent to lab; awaiting result. ER 
tech will arrange transfer once medically cleared by ERMD.

## 2019-10-19 NOTE — NUR
ER Nurse  Note:



Belongings taken and placed in locker 2. Pt calm, sitting in chair. Will 
continue to montior.

## 2020-09-10 NOTE — NUR
Patients boyfriend Sammy Mendez can be contacted at [phone]

Patients friend Barb Vanessa can be contacted at [phone] How Severe Is Your Skin Lesion?: moderate Have Your Skin Lesions Been Treated?: not been treated Is This A New Presentation, Or A Follow-Up?: Skin Lesion